# Patient Record
Sex: FEMALE | Race: WHITE | NOT HISPANIC OR LATINO | URBAN - NONMETROPOLITAN AREA
[De-identification: names, ages, dates, MRNs, and addresses within clinical notes are randomized per-mention and may not be internally consistent; named-entity substitution may affect disease eponyms.]

---

## 2018-12-28 ENCOUNTER — PROCEDURE VISIT (OUTPATIENT)
Dept: OBSTETRICS AND GYNECOLOGY | Facility: CLINIC | Age: 48
End: 2018-12-28

## 2018-12-28 VITALS
WEIGHT: 138 LBS | HEIGHT: 63 IN | BODY MASS INDEX: 24.45 KG/M2 | SYSTOLIC BLOOD PRESSURE: 119 MMHG | DIASTOLIC BLOOD PRESSURE: 60 MMHG

## 2018-12-28 DIAGNOSIS — Z12.31 ENCOUNTER FOR SCREENING MAMMOGRAM FOR BREAST CANCER: ICD-10-CM

## 2018-12-28 DIAGNOSIS — R68.82 DECREASED LIBIDO: ICD-10-CM

## 2018-12-28 DIAGNOSIS — N94.10 DYSPAREUNIA IN FEMALE: ICD-10-CM

## 2018-12-28 DIAGNOSIS — Z01.419 ENCOUNTER FOR GYNECOLOGICAL EXAMINATION WITHOUT ABNORMAL FINDING: Primary | ICD-10-CM

## 2018-12-28 DIAGNOSIS — N95.1 MENOPAUSAL SYMPTOMS: ICD-10-CM

## 2018-12-28 PROCEDURE — 99386 PREV VISIT NEW AGE 40-64: CPT | Performed by: OBSTETRICS & GYNECOLOGY

## 2018-12-28 RX ORDER — LAMOTRIGINE 200 MG/1
400 TABLET ORAL 2 TIMES DAILY
COMMUNITY

## 2018-12-28 RX ORDER — LEVETIRACETAM 1000 MG/1
1000 TABLET ORAL 2 TIMES DAILY
COMMUNITY

## 2018-12-28 RX ORDER — LANOLIN ALCOHOL/MO/W.PET/CERES
400 CREAM (GRAM) TOPICAL DAILY
COMMUNITY

## 2018-12-28 NOTE — PROGRESS NOTES
Subjective  Chief Complaint   Patient presents with   • Gynecologic Exam     Patient advised due for IUD removal in February, patient complains of decrease libido and vaginal dryness.      Patient is 48 y.o.  here for annual examination.  Pt has not had a pap since prior to IUD insertion.  Pt had IUD inserted in the OR 2014.  Pt reports over the last two years having hot flashes, night sweats.  Pt with vaginal dryness as well as pain with intercourse.  Pt reports she has tried numerous otc lubricants with no relief.  Pt reports having no sensation as well as no sexual desire.  Pt reports family members have gone through menopause in their 40's.  Pt had IUD previously removed and replaced in the OR in the past secondary to cervical stenosis.  Pt has not had a recent MMG.    History  Past Medical History:   Diagnosis Date   • Encounter for insertion of mirena IUD 2013    INSERTED IN OR   • Epilepsy (CMS/[facility])      Current Outpatient Medications on File Prior to Visit   Medication Sig Dispense Refill   • folic acid (FOLVITE) 400 MCG tablet Take 400 mcg by mouth Daily.     • lamoTRIgine (LaMICtal) 200 MG tablet Take 400 mg by mouth 2 (Two) Times a Day.     • levETIRAcetam (KEPPRA) 1000 MG tablet Take 1,000 mg by mouth 2 (Two) Times a Day.       No current facility-administered medications on file prior to visit.      No Known Allergies  Past Surgical History:   Procedure Laterality Date   • BREAST CYST EXCISION     •  SECTION     • INTRAUTERINE DEVICE INSERTION  2014    REMOVAL AND REINSERTION IN OR DUE TO CERVICAL STENOSIS (IAN GARCIA)   • VASCULAR SURGERY       Family History   Problem Relation Age of Onset   • No Known Problems Father    • No Known Problems Mother    • No Known Problems Brother    • No Known Problems Sister    • No Known Problems Son    • No Known Problems Daughter    • No Known Problems Paternal Grandfather    • No Known Problems Paternal Grandmother    • No Known Problems  "Maternal Grandmother    • No Known Problems Maternal Grandfather    • No Known Problems Maternal Aunt    • No Known Problems Maternal Uncle    • No Known Problems Paternal Aunt    • No Known Problems Paternal Uncle      Social History     Socioeconomic History   • Marital status:      Spouse name: Not on file   • Number of children: Not on file   • Years of education: Not on file   • Highest education level: Not on file   Tobacco Use   • Smoking status: Current Every Day Smoker     Packs/day: 1.00     Types: Cigarettes   • Smokeless tobacco: Never Used   Substance and Sexual Activity   • Alcohol use: No     Frequency: Never   • Drug use: No   • Sexual activity: Yes     Partners: Male     Birth control/protection: IUD     Review of Systems  All systems were reviewed and negative except for:  Genitourinary: postivie for  painful intercourse and vaginal dryness  Behavioral/Psych: positive for  decreased sexual drive     Objective  Vitals:    12/28/18 1516   BP: 119/60   Weight: 62.6 kg (138 lb)   Height: 160 cm (63\")     Physical Exam:  General Appearance: alert, appears stated age and cooperative  Head: normocephalic, without obvious abnormality and atraumatic  Eyes: lids and lashes normal, conjunctivae and sclerae normal, no icterus, no pallor, corneas clear and PERRLA  Ears: ears appear intact with no abnormalities noted  Nose: nares normal, septum midline, mucosa normal and no drainage  Neck: suppple, trachea midline and no thyromegaly  Lungs: clear to auscultation, respirations regular, respirations even and respirations unlabored  Heart: regular rhythm and normal rate, normal S1, S2, no murmur, gallop, or rubs and no click  Breasts: Examined in supine position  Symmetric without masses or skin dimpling  Nipples normal without inversion, lesions or discharge  There are no palpable axillary nodes  Abdomen: normal bowel sounds, no masses, no hepatomegaly, no splenomegaly, soft non-tender, no guarding and no " rebound tenderness  Pelvic: Clinical staff was present for exam  External genitalia:  normal appearance of the external genitalia including Bartholin's and [place]'s glands.  :  urethral meatus normal;  Vaginal:  normal pink mucosa without prolapse or lesions.  Cervix:  normal appearance. IUD string present - 3 cms in length;  Uterus:  normal size, shape and consistency.  Adnexa:  normal bimanual exam of the adnexa.  Pap smear done and specimen sent using Thin-Prep technique  Extremities: moves extremities well, no edema, no cyanosis and no redness  Skin: no bleeding, bruising or rash and no lesions noted  Lymph Nodes: no palpable adenopathy  Neuro: CN II-X grossly intact; sensation intact  Psych: normal mood and affect, oriented to person, time and place, thought content organized and appropriate judgment  Lab Review   No data reviewed    Imaging   No data reviewed    Assessment/Plan  Problem List Items Addressed This Visit     None      Visit Diagnoses     Encounter for gynecological examination without abnormal finding    -  Primary  Pap was done today.  If she does not receive the results of the Pap within 2 weeks  time, she was instructed to call to find out the results.  I explained to Bonita that the recommendations for Pap smear interval in a low risk patient has lengthened to 3 years time if cytology alone normal or  5 years time if both cytology and HPV testing were normal.  I encouraged her to be seen yearly for a full physical exam including breast and pelvic exam even during the off years when PAP's will not be performed.    Relevant Orders    Pap IG, Rfx HPV ASCU    Menopausal symptoms      Will check the following labs.  If FSH clearly in menopausal range then patient informed can remove IUD and see if patient has any bleeding, using back up form of birth control and repeat FSH level.  The various options for the management of menopausal symptoms was discussed.  The medical treatment options  discussed include HRT, SSRIs, SSNRIs, clonidine, and gabapentin.  The risks and benefits were discussed including the findings from the WHI study.  The increased risk of breast cancer, CAD, stroke, and VTE events were discussed for combination therapy vs the increased risk of CV events and breast cancer not being seen in the estrogen only group.  The lowest effective dose for the shortest duration of treatment was discussed in regards to HRT.  Other alternatives including otc supplements and lifestyle changes were also discussed.  Local estrogen therapy to relieve atrophic vaginal symptoms was discussed was well as other alternatives.    Relevant Orders    Follicle Stimulating Hormone    Estradiol    Testosterone, Free, Total    Pap IG, Rfx HPV ASCU    Encounter for screening mammogram for breast cancer      It is recommended per ACOG, for women at average risk to start annual mammogram screening at the age of 40 until the age of 75 and an individualized decision be made for women after age 75.  She was encouraged to continue getting yearly mammograms.  She should report any palpable breast lump(s) or skin changes regardless of mammographic findings.  I explained to Bonita that notification regarding her mammogram results will come from the center performing the study.  Our office will not be routinely calling with mammogram results.  It is her responsibility to make sure that the results from the mammogram are communicated to her by the breast center.  If she has any questions about the results, she is welcome to call our office anytime.  Pt to schedule.    Bonita was counseled regarding having clinical breast exams and breast self-awareness.  Women aged 29-39 years of age should have clinical breast exams every 1-3 years and yearly aged 40 and older.  The patient was counseled regarding breast self-awareness focusing on having a sense of what is normal for her breasts so that she can tell if there are  changes.  Even small changes should be reported to provider.    Relevant Orders    Mammo Screening Digital Tomosynthesis Bilateral With CAD    Decreased libido      Will check following.  Discussed various options for treatment as well as HRT with the risks vs benefits.  Pt to consider options pending labs.    Relevant Orders    Testosterone, Free, Total    Dyspareunia in female      Discussed the various options for treatment.  Recommend continued use of water based lubricant at present.  Plan pending labs as well as patients desire for further treatment.        Follow up as discussed/scheduled  This note was electronically signed.  Cherelle Cardoza M.D.

## 2018-12-29 LAB
ESTRADIOL SERPL-MCNC: 25.6 PG/ML
FSH SERPL-ACNC: 65.8 MIU/ML
TESTOST FREE SERPL-MCNC: 0.5 PG/ML (ref 0–4.2)
TESTOST SERPL-MCNC: <3 NG/DL (ref 8–48)

## 2019-01-10 DIAGNOSIS — N95.1 MENOPAUSAL SYMPTOMS: ICD-10-CM

## 2019-01-10 DIAGNOSIS — Z01.419 ENCOUNTER FOR GYNECOLOGICAL EXAMINATION WITHOUT ABNORMAL FINDING: ICD-10-CM

## 2019-01-23 ENCOUNTER — OFFICE VISIT (OUTPATIENT)
Dept: OBSTETRICS AND GYNECOLOGY | Facility: CLINIC | Age: 49
End: 2019-01-23

## 2019-01-23 VITALS
DIASTOLIC BLOOD PRESSURE: 60 MMHG | WEIGHT: 139 LBS | SYSTOLIC BLOOD PRESSURE: 120 MMHG | BODY MASS INDEX: 24.63 KG/M2 | HEIGHT: 63 IN

## 2019-01-23 DIAGNOSIS — R68.82 DECREASED LIBIDO: ICD-10-CM

## 2019-01-23 DIAGNOSIS — Z30.432 ENCOUNTER FOR REMOVAL OF INTRAUTERINE CONTRACEPTIVE DEVICE (IUD): Primary | ICD-10-CM

## 2019-01-23 DIAGNOSIS — N95.1 MENOPAUSAL SYMPTOMS: ICD-10-CM

## 2019-01-23 PROCEDURE — 99213 OFFICE O/P EST LOW 20 MIN: CPT | Performed by: OBSTETRICS & GYNECOLOGY

## 2019-01-23 PROCEDURE — 58301 REMOVE INTRAUTERINE DEVICE: CPT | Performed by: OBSTETRICS & GYNECOLOGY

## 2019-01-23 NOTE — PROGRESS NOTES
Subjective  Chief Complaint   Patient presents with   • Procedure     REMOVAL OF MIRENA IUD, INSERTED      Patient is 48 y.o.  here for removal of IUD.  Pt had previous labs done with findings showing menopause.  Pt has been having menopausal symptoms with hot flashes and night sweats.  Pt also has been having decreased libido as well as decreased sensation.  Pt reports symptoms unchanged.  Pt had labs done since last visit.    History  Past Medical History:   Diagnosis Date   • Encounter for insertion of mirena IUD     INSERTED IN OR   • Encounter for IUD removal 2019   • Epilepsy (CMS/[facility])      Current Outpatient Medications on File Prior to Visit   Medication Sig Dispense Refill   • folic acid (FOLVITE) 400 MCG tablet Take 400 mcg by mouth Daily.     • lamoTRIgine (LaMICtal) 200 MG tablet Take 400 mg by mouth 2 (Two) Times a Day.     • levETIRAcetam (KEPPRA) 1000 MG tablet Take 1,000 mg by mouth 2 (Two) Times a Day.       No current facility-administered medications on file prior to visit.      No Known Allergies  Past Surgical History:   Procedure Laterality Date   • BREAST CYST EXCISION     •  SECTION     • INTRAUTERINE DEVICE INSERTION  2014    REMOVAL AND REINSERTION IN OR DUE TO CERVICAL STENOSIS (IAN GARCIA)   • VASCULAR SURGERY       Family History   Problem Relation Age of Onset   • No Known Problems Father    • No Known Problems Mother    • No Known Problems Brother    • No Known Problems Sister    • No Known Problems Son    • No Known Problems Daughter    • No Known Problems Paternal Grandfather    • No Known Problems Paternal Grandmother    • No Known Problems Maternal Grandmother    • No Known Problems Maternal Grandfather    • No Known Problems Maternal Aunt    • No Known Problems Maternal Uncle    • No Known Problems Paternal Aunt    • No Known Problems Paternal Uncle      Social History     Socioeconomic History   • Marital status:      Spouse name: Not on  "file   • Number of children: Not on file   • Years of education: Not on file   • Highest education level: Not on file   Tobacco Use   • Smoking status: Current Every Day Smoker     Packs/day: 1.00     Types: Cigarettes   • Smokeless tobacco: Never Used   Substance and Sexual Activity   • Alcohol use: No     Frequency: Never   • Drug use: No   • Sexual activity: Yes     Partners: Male     Birth control/protection: IUD     Review of Systems  The following systems were reviewed and negative:  constitution, eyes, ENT, respiratory, cardiovascular, gastrointestinal, genitourinary, integument, breast, hematologic / lymphatic, musculoskeletal, neurological, behavioral/psych, endocrine and allergies / immunologic     Objective  Vitals:    01/23/19 1528   BP: 120/60   Weight: 63 kg (139 lb)   Height: 160 cm (63\")     Physical Exam:  General Appearance: alert, appears stated age and cooperative  Head: normocephalic, without obvious abnormality and atraumatic  Eyes: lids and lashes normal, conjunctivae and sclerae normal, no icterus, no pallor, corneas clear and PERRLA  Ears: ears appear intact with no abnormalities noted  Nose: nares normal, septum midline, mucosa normal and no drainage  Neck: suppple, trachea midline and no thyromegaly  Lungs: clear to auscultation, respirations regular, respirations even and respirations unlabored  Heart: regular rhythm and normal rate, normal S1, S2, no murmur, gallop, or rubs and no click  Breasts: Not performed.  Abdomen: normal bowel sounds, no masses, no hepatomegaly, no splenomegaly, soft non-tender, no guarding and no rebound tenderness  Pelvic: Clinical staff was present for exam  External genitalia:  normal appearance of the external genitalia including Bartholin's and [place]'s glands.  :  urethral meatus normal;  Vaginal:  normal pink mucosa without prolapse or lesions.  Cervix:  normal appearance. IUD string present - 3 cms in length;  Uterus:  normal size, shape and " consistency.  Adnexa:  normal bimanual exam of the adnexa.  Extremities: moves extremities well, no edema, no cyanosis and no redness  Skin: no bleeding, bruising or rash and no lesions noted  Lymph Nodes: no palpable adenopathy  Neuro: CN II-X grossly intact; sensation intact  Psych: normal mood and affect, oriented to person, time and place, thought content organized and appropriate judgment  Lab Review   Labs as noted  Procedure visit on 12/28/2018   Component Date Value   • FSH 12/28/2018 65.8    • Estradiol 12/28/2018 25.6    • Testosterone, Total 12/28/2018 <3*   • Testosterone, Free 12/28/2018 0.5        Imaging   No data reviewed    IUD Removal    Date of procedure:  1/27/2019    Risks and benefits discussed? yes  All questions answered? yes  Consents given by The patient  Written consent obtained? yes  Reason for removal: Device expiration    Local anesthesia used:  no    Procedure documentation:    A speculum was placed in order to view the cervix.  A tenaculum did not need to be placed on the anterior cervical lip.  Cervical dilation did not need to be performed in order to access the string.  The IUD string was easily seen.  The string was grasped and the IUD was removed without difficulty.  The IUD did not appear to be adherent to the uterine cavity. It was removed intact.    She tolerated the procedure without any difficulty.     Post procedure instructions: Patient notified to call with heavy bleeding, fever or increasing pain.      Assessment/Plan  Problem List Items Addressed This Visit     None      Visit Diagnoses     Encounter for removal of intrauterine contraceptive device (IUD)    -  Primary New  IUD removed without complications.  Instructions and precautions given.  Pt to return if one month for repeat FSH.  Pt informed to use back up form of birth control.  Pt to call if any bleeding.      Menopausal symptoms    Unchanged  Pt informed regarding labs as noted above.  Discussed the various  options for treatment.  Pt to consider options.  Repeat FSH in 1 month; consider treatment thereafter if desires.    Decreased libido    Unchanged  Discussed various treatment options.  Will repeat FSH in 1 month.  Pt to consider treatment if desired.        Follow up as discussed/scheduled  This note was electronically signed.  Cherelle Cardoza M.D.

## 2019-02-27 DIAGNOSIS — Z78.0 MENOPAUSE: Primary | ICD-10-CM

## 2019-02-28 LAB — FSH SERPL-ACNC: 85.7 MIU/ML

## 2019-03-01 ENCOUNTER — TELEPHONE (OUTPATIENT)
Dept: OBSTETRICS AND GYNECOLOGY | Facility: CLINIC | Age: 49
End: 2019-03-01

## 2019-03-01 NOTE — TELEPHONE ENCOUNTER
Dr. Cardoza,   I informed patient of test results, she wanted to see if she can have a prescription for vaginal estrogen sent to the pharmacy?

## 2019-05-13 ENCOUNTER — TELEPHONE (OUTPATIENT)
Dept: OBSTETRICS AND GYNECOLOGY | Facility: CLINIC | Age: 49
End: 2019-05-13

## 2019-05-13 NOTE — TELEPHONE ENCOUNTER
Patient complains of hot flashes, night sweats, mood changes and advised she is ready to go on hormone replacement therapy.

## 2019-05-13 NOTE — TELEPHONE ENCOUNTER
----- Message from Marcela Luo sent at 5/13/2019  2:29 PM EDT -----  Contact: Pt  Pt said she needs to speak with Magali concerning a medication.

## 2019-06-20 ENCOUNTER — OFFICE VISIT (OUTPATIENT)
Dept: OBSTETRICS AND GYNECOLOGY | Facility: CLINIC | Age: 49
End: 2019-06-20

## 2019-06-20 VITALS
HEIGHT: 63 IN | DIASTOLIC BLOOD PRESSURE: 60 MMHG | SYSTOLIC BLOOD PRESSURE: 118 MMHG | WEIGHT: 136 LBS | BODY MASS INDEX: 24.1 KG/M2

## 2019-06-20 DIAGNOSIS — N94.10 DYSPAREUNIA IN FEMALE: ICD-10-CM

## 2019-06-20 DIAGNOSIS — R68.82 DECREASED LIBIDO: ICD-10-CM

## 2019-06-20 DIAGNOSIS — N95.1 MENOPAUSAL SYMPTOMS: Primary | ICD-10-CM

## 2019-06-20 PROCEDURE — 99214 OFFICE O/P EST MOD 30 MIN: CPT | Performed by: OBSTETRICS & GYNECOLOGY

## 2019-06-20 RX ORDER — MEDROXYPROGESTERONE ACETATE 2.5 MG/1
2.5 TABLET ORAL DAILY
Qty: 30 TABLET | Refills: 5 | Status: SHIPPED | OUTPATIENT
Start: 2019-06-20

## 2019-06-20 RX ORDER — ESTERIFIED ESTROGEN AND METHYLTESTOSTERONE 1.25; 2.5 MG/1; MG/1
1 TABLET ORAL DAILY
Qty: 30 TABLET | Refills: 5 | Status: SHIPPED | OUTPATIENT
Start: 2019-06-20

## 2019-06-20 NOTE — PROGRESS NOTES
Subjective  Chief Complaint   Patient presents with   • Follow-up     Follow up menopausal symptoms, and decrease libido. Patient advised no improvement in symptoms.      Patient is 48 y.o.  here for f/u of her menopausal symptoms as well as decreased libido.  Pt has been on prempro now since May.  Pt had her mirena iud removed earlier this year.  Pt had called back with menopausal symptoms requesting medication as noted.   Pt reports no improvement in any of her symptoms.  Pt reports continued hot flashes, night sweats, insomnia.  Pt reports her night sweats have actually worsened.  Pt is having to sleep on the floor secondary to her sweats.  Pt also with no improvement in her libido.  Pt still with no sexual desire.  Pt has also been using the estrogen cream.  Pt reports no improvement with her dryness or pain with intercourse.    History  Past Medical History:   Diagnosis Date   • Encounter for insertion of mirena IUD 2013    INSERTED IN OR   • Encounter for IUD removal 2019   • Epilepsy (CMS/[facility])      Current Outpatient Medications on File Prior to Visit   Medication Sig Dispense Refill   • conjugated estrogens (PREMARIN) 0.625 MG/GM vaginal cream 0.5 GRAMS INSERT VAGINALLY AT BEDTIME TWICE WEEKLY 30 g 11   • folic acid (FOLVITE) 400 MCG tablet Take 400 mcg by mouth Daily.     • lamoTRIgine (LaMICtal) 200 MG tablet Take 400 mg by mouth 2 (Two) Times a Day.     • levETIRAcetam (KEPPRA) 1000 MG tablet Take 1,000 mg by mouth 2 (Two) Times a Day.       No current facility-administered medications on file prior to visit.      No Known Allergies  Past Surgical History:   Procedure Laterality Date   • BREAST CYST EXCISION     •  SECTION     • INTRAUTERINE DEVICE INSERTION  2014    REMOVAL AND REINSERTION IN OR DUE TO CERVICAL STENOSIS (IAN LAND)   • VASCULAR SURGERY       Family History   Problem Relation Age of Onset   • No Known Problems Father    • No Known Problems Mother    • No Known  "Problems Brother    • No Known Problems Sister    • No Known Problems Son    • No Known Problems Daughter    • No Known Problems Paternal Grandfather    • No Known Problems Paternal Grandmother    • No Known Problems Maternal Grandmother    • No Known Problems Maternal Grandfather    • No Known Problems Maternal Aunt    • No Known Problems Maternal Uncle    • No Known Problems Paternal Aunt    • No Known Problems Paternal Uncle      Social History     Socioeconomic History   • Marital status:      Spouse name: Not on file   • Number of children: Not on file   • Years of education: Not on file   • Highest education level: Not on file   Tobacco Use   • Smoking status: Current Every Day Smoker     Packs/day: 1.00     Types: Cigarettes   • Smokeless tobacco: Never Used   Substance and Sexual Activity   • Alcohol use: No     Frequency: Never   • Drug use: No   • Sexual activity: Yes     Partners: Male     Birth control/protection: IUD     Review of Systems  All systems were reviewed and negative except for:  Genitourinary: postivie for  painful intercourse  Behavioral/Psych: positive for  decreased sexual drive  Endocrine: positive for  hot flashes and night sweats     Objective  Vitals:    06/20/19 1551   BP: 118/60   Weight: 61.7 kg (136 lb)   Height: 160 cm (63\")     Physical Exam:  General Appearance: alert, appears stated age and cooperative  Head: normocephalic, without obvious abnormality and atraumatic  Eyes: lids and lashes normal, conjunctivae and sclerae normal, no icterus, no pallor, corneas clear and PERRLA  Ears: ears appear intact with no abnormalities noted  Nose: nares normal, septum midline, mucosa normal and no drainage  Neck: suppple, trachea midline and no thyromegaly  Lungs: clear to auscultation, respirations regular, respirations even and respirations unlabored  Heart: regular rhythm and normal rate, normal S1, S2, no murmur, gallop, or rubs and no click  Breasts: Not performed.  Abdomen: " normal bowel sounds, no masses, no hepatomegaly, no splenomegaly, soft non-tender, no guarding and no rebound tenderness  Pelvic: Not performed.  Extremities: moves extremities well, no edema, no cyanosis and no redness  Skin: no bleeding, bruising or rash and no lesions noted  Lymph Nodes: no palpable adenopathy  Neuro: CN II-X grossly intact; sensation intact  Psych: normal mood and affect, oriented to person, time and place, thought content organized and appropriate judgment  Lab Review   No data reviewed    Imaging   No data reviewed    Decision to Obtain Medical Records  No    Summary of Medical Records  No    Assessment/Plan  Problem List Items Addressed This Visit     None      Visit Diagnoses     Menopausal symptoms    -  Primary Worsening  I discussed with the patient the various options for treatment.  Given patient with no improvement in symptoms then plan trial with estratest.  I discussed with the patient the risks, benefits, complications, and other alternatives.  Rx estratest given; pt instructed to continue her provera as well.  Instructions and precautions given.  Pt to f/u in 6 weeks.    Decreased libido    Unchanged  Pt with no improvement in her decreased libido.  Will change HRT to Estratest with the addition of testosterone.  The risks vs benefits were discussed.  Instructions and precautions given.    Dyspareunia in female    Unchanged  Pt to continue her premarin cream as given.  Pt also instructed in the use of water based lubricants.  HRT changed as well.  Plan pending results.        Follow up as discussed/scheduled  Note: Speech recognition transcription software may have been used to dictate portions of this document.  An attempt at proofreading has been made though minor errors in transcription may still be present.  This note was electronically signed.  Cherelle Cardoza M.D.

## 2019-06-21 ENCOUNTER — TELEPHONE (OUTPATIENT)
Dept: OBSTETRICS AND GYNECOLOGY | Facility: CLINIC | Age: 49
End: 2019-06-21

## 2019-07-16 ENCOUNTER — OFFICE VISIT (OUTPATIENT)
Dept: OBSTETRICS AND GYNECOLOGY | Facility: CLINIC | Age: 49
End: 2019-07-16

## 2019-07-16 DIAGNOSIS — R56.9 SEIZURE (HCC): Primary | ICD-10-CM

## 2019-07-16 DIAGNOSIS — N94.10 DYSPAREUNIA IN FEMALE: ICD-10-CM

## 2019-07-16 DIAGNOSIS — N95.1 MENOPAUSAL SYMPTOMS: Primary | ICD-10-CM

## 2019-07-16 DIAGNOSIS — R68.82 DECREASED LIBIDO: ICD-10-CM

## 2019-07-16 DIAGNOSIS — R56.9 SEIZURE (HCC): ICD-10-CM

## 2019-07-16 PROCEDURE — 99213 OFFICE O/P EST LOW 20 MIN: CPT | Performed by: OBSTETRICS & GYNECOLOGY

## 2019-07-18 VITALS — HEIGHT: 63 IN | BODY MASS INDEX: 24.09 KG/M2

## 2019-07-18 LAB
LAMOTRIGINE SERPL-MCNC: 5.8 UG/ML (ref 2–20)
LEVETIRACETAM SERPL-MCNC: 27.5 UG/ML (ref 10–40)

## 2019-07-18 NOTE — PROGRESS NOTES
Subjective  Chief Complaint   Patient presents with   • Follow-up     Follow up discuss menopausal symptoms.      Patient is 48 y.o.  here for follow-up of her menopausal symptoms.  Patient reports she is continuing to have hot flashes as well as night sweats.  Patient had previously been seen and was given Estratest.  Patient reports no improvement in her symptoms.  Patient also continues to have decreased libido as well as pain with intercourse.  Patient is using her estrogen cream as well.  Patient has also recently been seen by her neurologist Dr. Kelsy Scott at .  Patient reports her neurologist is requesting that the patient have levels of her Lamictal and Keppra checked.  Patient is concerned that her hormone replacement therapy is affecting her seizure medications.  Patient denies any recent or active seizures.  Patient reports her levels are to be faxed to her neurologist.    History  Past Medical History:   Diagnosis Date   • Encounter for insertion of mirena IUD 2013    INSERTED IN OR   • Encounter for IUD removal 2019   • Epilepsy (CMS/[facility])      Current Outpatient Medications on File Prior to Visit   Medication Sig Dispense Refill   • conjugated estrogens (PREMARIN) 0.625 MG/GM vaginal cream 0.5 GRAMS INSERT VAGINALLY AT BEDTIME TWICE WEEKLY 30 g 11   • estrogens, conjugated,-methyltestosterone (EEMT,COVARYX) 1.25-2.5 MG per tablet Take 1 tablet by mouth Daily. 30 tablet 5   • folic acid (FOLVITE) 400 MCG tablet Take 400 mcg by mouth Daily.     • lamoTRIgine (LaMICtal) 200 MG tablet Take 400 mg by mouth 2 (Two) Times a Day.     • levETIRAcetam (KEPPRA) 1000 MG tablet Take 1,000 mg by mouth 2 (Two) Times a Day.     • medroxyPROGESTERone (PROVERA) 2.5 MG tablet Take 1 tablet by mouth Daily. 30 tablet 5     No current facility-administered medications on file prior to visit.      No Known Allergies  Past Surgical History:   Procedure Laterality Date   • BREAST CYST EXCISION     •   "SECTION     • INTRAUTERINE DEVICE INSERTION  02/21/2014    REMOVAL AND REINSERTION IN OR DUE TO CERVICAL STENOSIS ([facility])   • VASCULAR SURGERY       Family History   Problem Relation Age of Onset   • No Known Problems Father    • No Known Problems Mother    • No Known Problems Brother    • No Known Problems Sister    • No Known Problems Son    • No Known Problems Daughter    • No Known Problems Paternal Grandfather    • No Known Problems Paternal Grandmother    • No Known Problems Maternal Grandmother    • No Known Problems Maternal Grandfather    • No Known Problems Maternal Aunt    • No Known Problems Maternal Uncle    • No Known Problems Paternal Aunt    • No Known Problems Paternal Uncle      Social History     Socioeconomic History   • Marital status:      Spouse name: Not on file   • Number of children: Not on file   • Years of education: Not on file   • Highest education level: Not on file   Tobacco Use   • Smoking status: Current Every Day Smoker     Packs/day: 1.00     Types: Cigarettes   • Smokeless tobacco: Never Used   Substance and Sexual Activity   • Alcohol use: No     Frequency: Never   • Drug use: No   • Sexual activity: Yes     Partners: Male     Birth control/protection: IUD     Review of Systems  All systems were reviewed and negative except for:  Endocrine: positive for  hot flashes and night sweats     Objective  Vitals:    07/18/19 1054   Height: 160 cm (63\")     Physical Exam:  General Appearance: alert, appears stated age and cooperative  Head: normocephalic, without obvious abnormality and atraumatic  Eyes: lids and lashes normal, conjunctivae and sclerae normal, no icterus, no pallor, corneas clear and PERRLA  Ears: ears appear intact with no abnormalities noted  Nose: nares normal, septum midline, mucosa normal and no drainage  Neck: suppple, trachea midline and no thyromegaly  Lungs: clear to auscultation, respirations regular, respirations even and respirations " unlabored  Heart: regular rhythm and normal rate, normal S1, S2, no murmur, gallop, or rubs and no click  Breasts: Not performed.  Abdomen: normal bowel sounds, no masses, no hepatomegaly, no splenomegaly, soft non-tender, no guarding and no rebound tenderness  Pelvic: Not performed.  Extremities: moves extremities well, no edema, no cyanosis and no redness  Skin: no bleeding, bruising or rash and no lesions noted  Lymph Nodes: no palpable adenopathy  Neuro: CN II-X grossly intact; sensation intact  Psych: normal mood and affect, oriented to person, time and place, thought content organized and appropriate judgment  Lab Review   No data reviewed    Imaging   No data reviewed    Decision to Obtain Medical Records  No    Summary of Medical Records  No    Assessment/Plan  Problem List Items Addressed This Visit     None      Visit Diagnoses     Menopausal symptoms    -  Primary Unchanged  Patient with no improvement in her menopausal symptoms.  Patient has been on her Estratest for approximately 4 weeks.  Will obtain the following drug levels of her seizure medication as requested.  Plan pending response.    Seizure (CMS/HCC)      Patient with history of seizure disorder.  Patient is requesting drug levels for both her Lamictal and Keppra.  The results are to [facility] fax[facility] to [facility]  The fax number is [phone].    Decreased libido    Unchanged  Patient with no changes in her decreased libido patient however has only been on her medication for 4 weeks.  Plan we will check the above drug levels and make sure there is no interference with her medication.  Plan pending results.    Dyspareunia in female    Unchanged  Patient is to continue her estrogen cream as given.  Patient is to continue her current medications at present.  Plan pending results of the above testing.        Follow up as discussed/scheduled  Note: Speech recognition transcription software may have been used to  dictate portions of this document.  An attempt at proofreading has been made though minor errors in transcription may still be present.  This note was electronically signed.  Cherelle Cardoza M.D.